# Patient Record
Sex: MALE | Race: WHITE
[De-identification: names, ages, dates, MRNs, and addresses within clinical notes are randomized per-mention and may not be internally consistent; named-entity substitution may affect disease eponyms.]

---

## 2019-07-08 ENCOUNTER — HOSPITAL ENCOUNTER (INPATIENT)
Dept: HOSPITAL 74 - YASAS | Age: 44
LOS: 5 days | Discharge: HOME | DRG: 773 | End: 2019-07-13
Attending: SURGERY | Admitting: SURGERY
Payer: COMMERCIAL

## 2019-07-08 VITALS — BODY MASS INDEX: 31.8 KG/M2

## 2019-07-08 DIAGNOSIS — F11.23: ICD-10-CM

## 2019-07-08 DIAGNOSIS — R94.5: ICD-10-CM

## 2019-07-08 DIAGNOSIS — E80.6: ICD-10-CM

## 2019-07-08 DIAGNOSIS — F10.230: Primary | ICD-10-CM

## 2019-07-08 PROCEDURE — HZ2ZZZZ DETOXIFICATION SERVICES FOR SUBSTANCE ABUSE TREATMENT: ICD-10-PCS | Performed by: SURGERY

## 2019-07-08 RX ADMIN — Medication SCH TAB: at 10:24

## 2019-07-08 RX ADMIN — Medication SCH MG: at 22:21

## 2019-07-08 RX ADMIN — METHOCARBAMOL PRN MG: 500 TABLET ORAL at 22:22

## 2019-07-08 NOTE — PN
S CIWA





- CIWA Score


Nausea/Vomitin-Mild Nausea/No Vomiting


Muscle Tremors: 3


Anxiety: 2


Agitation: 3


Paroxysmal Sweats: 1-Minimal Palms Moist


Orientation: 1-Uncertain about Date


Tacttile Disturbances: 1-Very Mild Itch/Numbness


Auditory Disturbances: 0-None


Visual Disturbances: 0-None


Headache: 0-None Present


CIWA-Ar Total Score: 12





BHS Progress Note (SOAP)


Subjective: 





tremor restlessness anxiety


tired low energy 


Objective: 





19 14:35


 Vital Signs











Temperature  96 F L  19 13:06


 


Pulse Rate  90   19 13:06


 


Respiratory Rate  20   19 13:06


 


Blood Pressure  136/81   19 13:06


 


O2 Sat by Pulse Oximetry (%)      











19 14:36


lab ordered for 19 14:36


first admission to [facility] of references for comparison


Assessment: 





19 14:44


alcohol withdrawal sx


Plan: 





continue alcohol detox

## 2019-07-08 NOTE — HP
CIWA Score


Nausea/Vomitin-Mild Nausea/No Vomiting


Muscle Tremors: 4-Moderate,w/Arms Extend


Anxiety: 3


Agitation: 3


Paroxysmal Sweats: 2


Orientation: 0-Oriented


Tacttile Disturbances: 0-None


Auditory Disturbances: 0-None


Visual Disturbances: 0-None


Headache: 2-Mild


CIWA-Ar Total Score: 15





- Admission Criteria


[facility] Guidelines: Admission for Medically Managed Detox: 


Requires at least one of the followin. CIWA greater than 12


2. Seizures within the past 24 hours


3. Delirium tremens within the past 24 hours


4. Hallucinations within the past 24 hours


5. Acute intervention needed for co  occurring medical disorder


6. Acute intervention needed for co  occurring psychiatric disorder


7. Severe withdrawal that cannot be handled at a lower level of care (continued


    vomiting, continued diarrhea, abnormal vital signs) requiring intravenous


    medication and/or fluids


8. Pregnancy








Admission ROS S





- [facility]


Chief Complaint: 





Alcohol withdrawal symptoms


Allergies/Adverse Reactions: 


 Allergies











Allergy/AdvReac Type Severity Reaction Status Date / Time


 


No Known Allergies Allergy   Verified 19 02:13











History of Present Illness: 





44 years old male with a long history of alcohol dependence is seeking 

admission to detox.  Patient reports that this is first inpatient 

rehabilitation and first admission to [facility]. He denies past medical history and 

denies suicidal ideation at this time. 


Exam Limitations: Intoxication





- Ebola screening


Have you traveled outside of the country in the last 21 days: No


Have you had contact with anyone from an Ebola affected area: No


Do you have a fever: No





- Review of Systems


Constitutional: Chills, Loss of Appetite, Malaise, Changes in sleep


EENT: reports: Sinus Pressure


Respiratory: reports: No Symptoms reported


Cardiac: reports: No Symptoms Reported


GI: reports: Poor Appetite, Poor Fluid Intake, Abdominal cramping


: reports: No Symptoms Reported


Musculoskeletal: reports: No Symptoms Reported


Integumentary: reports: Dryness, Flushing


Neuro: reports: Tremors


Endocrine: reports: No Symptoms Reported


Hematology: reports: No Symptoms Reported


Psychiatric: reports: Anxious


Other Systems: Reviewed and Negative





Patient History





- Patient Medical History


Hx Anemia: No


Hx Asthma: No


Hx Chronic Obstructive Pulmonary Disease (COPD): No


Hx Cancer: No


Hx Cardiac Disorders: No


Hx Congestive Heart Failure: No


Hx Hypertension: No


Hx Hypercholesterolemia: No


Hx Pacemaker: No


HX Cerebrovascular Accident: No


Hx Seizures: No


Hx Dementia: No


Hx Diabetes: No


Hx Gastrointestinal Disorders: No


Hx Liver Disease: No


Hx Genitourinary Disorders: No


Hx Sexually Transmitted Disorders: No


Hx Renal Disease (ESRD): No


Hx Thyroid Disease: No


Hx Human Immunodeficiency Virus (HIV): No


Hx Hepatitis C: No


Hx Depression: No


Hx Suicide Attempt: No (Denies suicigal ideation at this time)


Hx Bipolar Disorder: No


Hx Schizophrenia: No


Other Medical History: Anxiety -Not on medication





- Patient Surgical History


Past Surgical History: Yes


Hx Neurologic Surgery: No


Hx Cataract Extraction: No


Hx Cardiac Surgery: No


Hx Lung Surgery: No


Hx Abdominal Surgery: No


Hx Appendectomy: No


Hx Cholecystectomy: No


Hx Genitourinary Surgery: No


Hx Orthopedic Surgery: Yes (broken shouldr repair 2018)


Hx Hysterectomy: No


Anesthesia Reaction: No





- PPD History


Previous Implant?: Yes


Documented Results: Negative w/o proof


Implanted On Prior [facility] Admission?: No


PPD to be Administered?: Yes





- Reproductive History


Patient is a Female of Child Bearing Age (11 -55 yrs old): No (male)





- Smoking Cessation


Smoking history: Never smoked


Have you smoked in the past 12 months: No


Hx Chewing Tobacco Use: No


Initiated information on smoking cessation: No





- Substance & Tx. History


Hx Alcohol Use: Yes


Hx Substance Use: Yes


Substance Use Type: Cocaine


Hx Substance Use Treatment: No





- Substances abused


  ** Alcohol


Substance route: Oral


Frequency: Daily


Amount used: 11/2 PINT of VODKA


Age of first use: 15


Date of last use: 19





Family Disease History





- Family Disease History


Family Disease History: CA: Grandparent





Admission Physical Exam [facility]





- Vital Signs


Vital Signs: 


 Vital Signs - 24 hr











  19





  01:50


 


Temperature 97.1 F L


 


Pulse Rate 82


 


Respiratory 18





Rate 


 


Blood Pressure 141/77














- Physical


General Appearance: Yes: Moderate Distress, Tremorous, Irritable, Sweating, 

Anxious


HEENTM: Yes: Within Normal Limits, Normal Voice


Respiratory: Yes: Lungs Clear, Normal Breath Sounds, No Respiratory Distress


Neck: Yes: Supple


Breast: Yes: Within Normal Limits


Cardiology: Yes: Regular Rhythm, Regular Rate


Abdominal: Yes: Normal Bowel Sounds


Genitourinary: Yes: Within Normal Limits


Back: Yes: Normal Inspection


Musculoskeletal: Yes: full range of Motion, Gait Steady


Extremities: Yes: Tremors


Neurological: Yes: Alert, Normal Mood/Affect


Integumentary: Yes: Within Normal Limits, Warm


Lymphatic: Yes: Within Normal Limits





- Diagnostic


(1) Alcohol dependence with uncomplicated withdrawal


Current Visit: Yes   Status: Chronic   





Cleared for Admission S





- Detox or Rehab


[facility] Level of Care: Medically Managed


Detox Regimen/Protocol: Librium





Breathalyzer





- Breathalyzer


Breathalyzer: 0.079





Urine Drug Screen





- Test Device


Lot number: [id number]


Expiration date: 21





- Control


Is test valid?: Yes





- Results


Drug screen NEGATIVE: No


Urine drug screen results: SHANNAN-Cocaine, BUP-Suboxone





Inpatient Rehab Admission





- Rehab Decision to Admit


Inpatient rehab admission?: No

## 2019-07-08 NOTE — EKG
Test Reason : 

Blood Pressure : ***/*** mmHG

Vent. Rate : 086 BPM     Atrial Rate : 086 BPM

   P-R Int : 172 ms          QRS Dur : 096 ms

    QT Int : 382 ms       P-R-T Axes : 060 -06 013 degrees

   QTc Int : 457 ms

 

NORMAL SINUS RHYTHM

INFERIOR INFARCT , AGE UNDETERMINED

ABNORMAL ECG

NO PREVIOUS ECGS AVAILABLE

Confirmed by ARIANNA BALDERRAMA MD ([id number]) on 7/8/2019 11:45:11 AM

 

Referred By:  REYNOLD           Confirmed By:ARIANNA BALDERRAMA MD

## 2019-07-09 LAB
ALBUMIN SERPL-MCNC: 3.8 G/DL (ref 3.4–5)
ALP SERPL-CCNC: 220 U/L (ref 45–117)
ALT SERPL-CCNC: 103 U/L (ref 13–61)
ANION GAP SERPL CALC-SCNC: 6 MMOL/L (ref 8–16)
AST SERPL-CCNC: 156 U/L (ref 15–37)
BILIRUB SERPL-MCNC: 2.1 MG/DL (ref 0.2–1)
BUN SERPL-MCNC: 10 MG/DL (ref 7–18)
CALCIUM SERPL-MCNC: 9.6 MG/DL (ref 8.5–10.1)
CHLORIDE SERPL-SCNC: 97 MMOL/L (ref 98–107)
CO2 SERPL-SCNC: 32 MMOL/L (ref 21–32)
CREAT SERPL-MCNC: 0.7 MG/DL (ref 0.55–1.3)
DEPRECATED RDW RBC AUTO: 13.6 % (ref 11.9–15.9)
GLUCOSE SERPL-MCNC: 128 MG/DL (ref 74–106)
HCT VFR BLD CALC: 38.6 % (ref 35.4–49)
HGB BLD-MCNC: 13.1 GM/DL (ref 11.7–16.9)
MCH RBC QN AUTO: 32.1 PG (ref 25.7–33.7)
MCHC RBC AUTO-ENTMCNC: 34 G/DL (ref 32–35.9)
MCV RBC: 94.4 FL (ref 80–96)
PLATELET # BLD AUTO: 177 K/MM3 (ref 134–434)
PMV BLD: 8.6 FL (ref 7.5–11.1)
POTASSIUM SERPLBLD-SCNC: 4.2 MMOL/L (ref 3.5–5.1)
PROT SERPL-MCNC: 8.5 G/DL (ref 6.4–8.2)
RBC # BLD AUTO: 4.09 M/MM3 (ref 4–5.6)
SODIUM SERPL-SCNC: 136 MMOL/L (ref 136–145)
WBC # BLD AUTO: 8.3 K/MM3 (ref 4–10)

## 2019-07-09 RX ADMIN — Medication PRN MG: at 22:14

## 2019-07-09 RX ADMIN — Medication SCH MG: at 22:14

## 2019-07-09 RX ADMIN — Medication SCH: at 10:11

## 2019-07-09 NOTE — PN
S CIWA





- CIWA Score


Nausea/Vomitin-Mild Nausea/No Vomiting


Muscle Tremors: 3


Anxiety: 2


Agitation: 2


Paroxysmal Sweats: 1-Minimal Palms Moist


Orientation: 1-Uncertain about Date


Tacttile Disturbances: 1-Very Mild Itch/Numbness


Auditory Disturbances: 0-None


Visual Disturbances: 0-None


Headache: 0-None Present


CIWA-Ar Total Score: 11





BHS COWS





- Scale


Resting Pulse: 0= FL 80 or Below


Sweatin= Chills/Flushing


Restless Observation: 0= Sits Still


Pupil Size: 0= Normal to Room Light


Bone or Joint Aches: 1= Mild Discomfort


Runny Nose/ Eye Tearin= Nasal Congestion


GI Upset > 30mins: 2= Nausea/Diarrhea


Tremor Observation of Outstretched Hands: 2= Slight Tremor Visible


Yawning Observation: 2= >3x During Session


Anxiety or Irritability: 2=Irritable/Anxious


Goose Flesh Skin: 0=Smooth Skin


COWS Score: 11





BHS Progress Note (SOAP)


Subjective: 





patient reported that he is taking suboxone 


positive urine suboxone


begin suboxone detox regimen


patient is doing much better since suboxne detox regimen begin


emotional support for maintain sobriety


Objective: 





19 13:09


 Vital Signs











Temperature  97 F L  19 09:11


 


Pulse Rate  78   19 09:11


 


Respiratory Rate  20   19 09:11


 


Blood Pressure  132/83   19 09:11


 


O2 Sat by Pulse Oximetry (%)      








 Laboratory Last Values











WBC  8.3 K/mm3 (4.0-10.0)   19  08:30    


 


RBC  4.09 M/mm3 (4.00-5.60)   19  08:30    


 


Hgb  13.1 GM/dL (11.7-16.9)   19  08:30    


 


Hct  38.6 % (35.4-49)   19  08:30    


 


MCV  94.4 fl (80-96)   19  08:30    


 


MCH  32.1 pg (25.7-33.7)   19  08:30    


 


MCHC  34.0 g/dl (32.0-35.9)   19  08:30    


 


RDW  13.6 % (11.9-15.9)   19  08:30    


 


Plt Count  177 K/MM3 (134-434)   19  08:30    


 


MPV  8.6 fl (7.5-11.1)   19  08:30    


 


Sodium  136 mmol/L (136-145)   19  08:30    


 


Potassium  4.2 mmol/L (3.5-5.1)   19  08:30    


 


Chloride  97 mmol/L ()  L  19  08:30    


 


Carbon Dioxide  32 mmol/L (21-32)   19  08:30    


 


Anion Gap  6 MMOL/L (8-16)  L  19  08:30    


 


BUN  10.0 mg/dL (7-18)   19  08:30    


 


Creatinine  0.7 mg/dL (0.55-1.3)   19  08:30    


 


Est GFR (CKD-EPI)AfAm  133.96   19  08:30    


 


Est GFR (CKD-EPI)NonAf  115.58   19  08:30    


 


Random Glucose  128 mg/dL ()  H  19  08:30    


 


Calcium  9.6 mg/dL (8.5-10.1)   19  08:30    


 


Total Bilirubin  2.1 mg/dL (0.2-1)  H  19  08:30    


 


AST  156 U/L (15-37)  H  19  08:30    


 


ALT  103 U/L (13-61)  H  19  08:30    


 


Alkaline Phosphatase  220 U/L ()  H  19  08:30    


 


Total Protein  8.5 g/dl (6.4-8.2)  H  19  08:30    


 


Albumin  3.8 g/dl (3.4-5.0)   19  08:30    








lab noted


ast elevation 


19 13:12


recommend ativan detox regimen


Assessment: 





19 13:13


alcohol and opiate withdrawal sx


Plan: 





continue alcohol and opiate detox

## 2019-07-10 RX ADMIN — Medication SCH TAB: at 10:12

## 2019-07-10 RX ADMIN — METHOCARBAMOL PRN MG: 500 TABLET ORAL at 22:36

## 2019-07-10 RX ADMIN — Medication PRN MG: at 22:35

## 2019-07-10 RX ADMIN — Medication SCH MG: at 22:35

## 2019-07-10 NOTE — PN
S CIWA





- CIWA Score


Nausea/Vomitin-Mild Nausea/No Vomiting


Muscle Tremors: 2


Anxiety: 2


Agitation: 3


Paroxysmal Sweats: 1-Minimal Palms Moist


Orientation: 0-Oriented


Tacttile Disturbances: 1-Very Mild Itch/Numbness


Auditory Disturbances: 0-None


Visual Disturbances: 0-None


Headache: 0-None Present


CIWA-Ar Total Score: 10





BHS COWS





- Scale


Resting Pulse: 1= SD 


Sweatin= Chills/Flushing


Restless Observation: 0= Sits Still


Pupil Size: 0= Normal to Room Light


Bone or Joint Aches: 1= Mild Discomfort


Runny Nose/ Eye Tearin= Nasal Congestion


GI Upset > 30mins: 2= Nausea/Diarrhea


Tremor Observation of Outstretched Hands: 2= Slight Tremor Visible


Yawning Observation: 1= 1-2x During Session


Anxiety or Irritability: 1=Feels Anxious/Irritable


Goose Flesh Skin: 0=Smooth Skin


COWS Score: 10





BHS Progress Note (SOAP)


Subjective: 





doing well with suboxone detox for opiate misuse 


resting on bed 


encourage to discuss feelings and concerns regarding alcohol recovery 


Objective: 





07/10/19 11:54


 Vital Signs











Temperature  97.8 F   07/10/19 09:15


 


Pulse Rate  99 H  07/10/19 09:15


 


Respiratory Rate  18   07/10/19 09:15


 


Blood Pressure  121/77   07/10/19 09:15


 


O2 Sat by Pulse Oximetry (%)      








 Laboratory Last Values











WBC  8.3 K/mm3 (4.0-10.0)   19  08:30    


 


RBC  4.09 M/mm3 (4.00-5.60)   19  08:30    


 


Hgb  13.1 GM/dL (11.7-16.9)   19  08:30    


 


Hct  38.6 % (35.4-49)   19  08:30    


 


MCV  94.4 fl (80-96)   19  08:30    


 


MCH  32.1 pg (25.7-33.7)   19  08:30    


 


MCHC  34.0 g/dl (32.0-35.9)   19  08:30    


 


RDW  13.6 % (11.9-15.9)   19  08:30    


 


Plt Count  177 K/MM3 (134-434)   19  08:30    


 


MPV  8.6 fl (7.5-11.1)   19  08:30    


 


Sodium  136 mmol/L (136-145)   19  08:30    


 


Potassium  4.2 mmol/L (3.5-5.1)   19  08:30    


 


Chloride  97 mmol/L ()  L  19  08:30    


 


Carbon Dioxide  32 mmol/L (21-32)   19  08:30    


 


Anion Gap  6 MMOL/L (8-16)  L  19  08:30    


 


BUN  10.0 mg/dL (7-18)   19  08:30    


 


Creatinine  0.7 mg/dL (0.55-1.3)   19  08:30    


 


Est GFR (CKD-EPI)AfAm  133.96   19  08:30    


 


Est GFR (CKD-EPI)NonAf  115.58   19  08:30    


 


Random Glucose  128 mg/dL ()  H  19  08:30    


 


Calcium  9.6 mg/dL (8.5-10.1)   19  08:30    


 


Total Bilirubin  2.1 mg/dL (0.2-1)  H  19  08:30    


 


AST  156 U/L (15-37)  H  19  08:30    


 


ALT  103 U/L (13-61)  H  19  08:30    


 


Alkaline Phosphatase  220 U/L ()  H  19  08:30    


 


Total Protein  8.5 g/dl (6.4-8.2)  H  19  08:30    


 


Albumin  3.8 g/dl (3.4-5.0)   19  08:30    


 


RPR Titer  Nonreactive  (NONREACTIVE)   19  08:30    








lab noted


discuss alcohol related ast elevation 


Assessment: 





07/10/19 11:57


alcohol and opiate withdrawal sx


Plan: 





continue alcohol and opiate detox

## 2019-07-11 RX ADMIN — METHOCARBAMOL PRN MG: 500 TABLET ORAL at 10:13

## 2019-07-11 RX ADMIN — Medication SCH TAB: at 10:13

## 2019-07-11 RX ADMIN — HYDROXYZINE HYDROCHLORIDE PRN MG: 25 TABLET, FILM COATED ORAL at 14:51

## 2019-07-11 RX ADMIN — Medication SCH MG: at 22:15

## 2019-07-11 RX ADMIN — Medication PRN MG: at 22:16

## 2019-07-11 NOTE — PN
S CIWA





- CIWA Score


Nausea/Vomitin-Mild Nausea/No Vomiting


Muscle Tremors: 1-None Visible, but Felt


Anxiety: 2


Agitation: 2


Paroxysmal Sweats: 1-Minimal Palms Moist


Orientation: 1-Uncertain about Date


Tacttile Disturbances: 0-None


Auditory Disturbances: 0-None


Visual Disturbances: 0-None


Headache: 0-None Present


CIWA-Ar Total Score: 8





BHS COWS





- Scale


Resting Pulse: 1= ND 


Sweatin= Chills/Flushing


Restless Observation: 0= Sits Still


Pupil Size: 0= Normal to Room Light


Bone or Joint Aches: 1= Mild Discomfort


Runny Nose/ Eye Tearin= None


GI Upset > 30mins: 1= Stomach Cramp


Tremor Observation of Outstretched Hands: 1= Tremor Felt, Not Seen


Yawning Observation: 0= None


Anxiety or Irritability: 1=Feels Anxious/Irritable


Goose Flesh Skin: 0=Smooth Skin


COWS Score: 6





BHS Progress Note (SOAP)


Subjective: 





44 years male admitted on 19 for alcohol and opiate withdrawal sx


doing well with suboxone detox regimen mild nausea and body aches


discuss medication assisted treatment program


Objective: 





19 14:57


 Vital Signs











Temperature  99.1 F   19 14:17


 


Pulse Rate  86   19 14:17


 


Respiratory Rate  18   19 14:17


 


Blood Pressure  112/64   19 14:17


 


O2 Sat by Pulse Oximetry (%)      








 Laboratory Last Values











WBC  8.3 K/mm3 (4.0-10.0)   19  08:30    


 


RBC  4.09 M/mm3 (4.00-5.60)   19  08:30    


 


Hgb  13.1 GM/dL (11.7-16.9)   19  08:30    


 


Hct  38.6 % (35.4-49)   19  08:30    


 


MCV  94.4 fl (80-96)   19  08:30    


 


MCH  32.1 pg (25.7-33.7)   19  08:30    


 


MCHC  34.0 g/dl (32.0-35.9)   19  08:30    


 


RDW  13.6 % (11.9-15.9)   19  08:30    


 


Plt Count  177 K/MM3 (134-434)   19  08:30    


 


MPV  8.6 fl (7.5-11.1)   19  08:30    


 


Sodium  136 mmol/L (136-145)   19  08:30    


 


Potassium  4.2 mmol/L (3.5-5.1)   19  08:30    


 


Chloride  97 mmol/L ()  L  19  08:30    


 


Carbon Dioxide  32 mmol/L (21-32)   19  08:30    


 


Anion Gap  6 MMOL/L (8-16)  L  19  08:30    


 


BUN  10.0 mg/dL (7-18)   19  08:30    


 


Creatinine  0.7 mg/dL (0.55-1.3)   19  08:30    


 


Est GFR (CKD-EPI)AfAm  133.96   19  08:30    


 


Est GFR (CKD-EPI)NonAf  115.58   19  08:30    


 


Random Glucose  128 mg/dL ()  H  19  08:30    


 


Calcium  9.6 mg/dL (8.5-10.1)   19  08:30    


 


Total Bilirubin  2.1 mg/dL (0.2-1)  H  19  08:30    


 


AST  156 U/L (15-37)  H  19  08:30    


 


ALT  103 U/L (13-61)  H  19  08:30    


 


Alkaline Phosphatase  220 U/L ()  H  19  08:30    


 


Total Protein  8.5 g/dl (6.4-8.2)  H  19  08:30    


 


Albumin  3.8 g/dl (3.4-5.0)   19  08:30    


 


RPR Titer  Nonreactive  (NONREACTIVE)   19  08:30    








lab noted


ast elevation


Assessment: 





19 14:58


alcohol and opiate withdrawal sx


Plan: 





continue alcohol and opiate detox

## 2019-07-12 LAB
ALP SERPL-CCNC: 196 U/L (ref 45–117)
APPEARANCE UR: CLEAR
AST SERPL-CCNC: 138 U/L (ref 15–37)
BACTERIA # UR AUTO: 0.7 /HPF
BILIRUB UR STRIP.AUTO-MCNC: NEGATIVE MG/DL
CASTS URNS QL MICRO: 0 /LPF (ref 0–8)
COLOR UR: YELLOW
EPITH CASTS URNS QL MICRO: 0.2 /HPF
KETONES UR QL STRIP: NEGATIVE
LEUKOCYTE ESTERASE UR QL STRIP.AUTO: NEGATIVE
NITRITE UR QL STRIP: NEGATIVE
PH UR: 5 [PH] (ref 5–8)
PROT UR QL STRIP: (no result)
PROT UR QL STRIP: NEGATIVE
RBC # BLD AUTO: 1 /HPF (ref 0–4)
SP GR UR: 1.01 (ref 1.01–1.03)
UROBILINOGEN UR STRIP-MCNC: 0.2 MG/DL (ref 0.2–1)
WBC # UR AUTO: 0 /HPF (ref 0–5)

## 2019-07-12 RX ADMIN — HYDROXYZINE HYDROCHLORIDE PRN MG: 25 TABLET, FILM COATED ORAL at 10:19

## 2019-07-12 RX ADMIN — Medication SCH MG: at 22:06

## 2019-07-12 RX ADMIN — HYDROXYZINE HYDROCHLORIDE PRN MG: 25 TABLET, FILM COATED ORAL at 22:05

## 2019-07-12 RX ADMIN — Medication SCH TAB: at 10:18

## 2019-07-12 RX ADMIN — Medication PRN MG: at 22:06

## 2019-07-12 NOTE — PN
S CIWA





- CIWA Score


Nausea/Vomitin-No Nausea/No Vomiting


Muscle Tremors: None


Anxiety: 3


Agitation: 1-Slight > Activity


Paroxysmal Sweats: No Perspiration


Orientation: 0-Oriented


Tacttile Disturbances: 1-Very Mild Itch/Numbness


Auditory Disturbances: 1-Very Mild


Visual Disturbances: 0-None


Headache: 0-None Present


CIWA-Ar Total Score: 6





BHS Progress Note (SOAP)


Subjective: 





Fatigue, anxious.


Objective: 


PATIENT A & O X 3, OBSERVED AMBULATING ON UNIT UNASSISTED. IN NO ACUTE DISTRESS.





19 15:17


 Vital Signs











Temperature  98.8 F   19 13:07


 


Pulse Rate  86   19 13:07


 


Respiratory Rate  18   19 13:07


 


Blood Pressure  124/71   19 13:07


 


O2 Sat by Pulse Oximetry (%)      








 Laboratory Tests











  19





  08:30 08:30 08:30


 


WBC  8.3  


 


RBC  4.09  


 


Hgb  13.1  


 


Hct  38.6  


 


MCV  94.4  


 


MCH  32.1  


 


MCHC  34.0  


 


RDW  13.6  


 


Plt Count  177  


 


MPV  8.6  


 


Sodium   136 


 


Potassium   4.2 


 


Chloride   97 L 


 


Carbon Dioxide   32 


 


Anion Gap   6 L 


 


BUN   10.0 


 


Creatinine   0.7 


 


Est GFR (CKD-EPI)AfAm   133.96 


 


Est GFR (CKD-EPI)NonAf   115.58 


 


Random Glucose   128 H 


 


Calcium   9.6 


 


Total Bilirubin   2.1 H 


 


AST   156 H 


 


ALT   103 H 


 


Alkaline Phosphatase   220 H 


 


Total Protein   8.5 H 


 


Albumin   3.8 


 


Urine Color   


 


Urine Appearance   


 


Urine pH   


 


Ur Specific Gravity   


 


Urine Protein   


 


Urine Glucose (UA)   


 


Urine Ketones   


 


Urine Blood   


 


Urine Nitrite   


 


Urine Bilirubin   


 


Urine Urobilinogen   


 


Ur Leukocyte Esterase   


 


Urine WBC (Auto)   


 


Urine RBC (Auto)   


 


Urine Casts (Auto)   


 


U Epithel Cells (Auto)   


 


Urine Bacteria (Auto)   


 


RPR Titer    Nonreactive














  19





  07:00 09:30


 


WBC  


 


RBC  


 


Hgb  


 


Hct  


 


MCV  


 


MCH  


 


MCHC  


 


RDW  


 


Plt Count  


 


MPV  


 


Sodium  


 


Potassium  


 


Chloride  


 


Carbon Dioxide  


 


Anion Gap  


 


BUN  


 


Creatinine  


 


Est GFR (CKD-EPI)AfAm  


 


Est GFR (CKD-EPI)NonAf  


 


Random Glucose  


 


Calcium  


 


Total Bilirubin  


 


AST  138 H 


 


ALT  


 


Alkaline Phosphatase  196 H 


 


Total Protein  


 


Albumin  


 


Urine Color   Yellow


 


Urine Appearance   Clear


 


Urine pH   5.0


 


Ur Specific Gravity   1.010


 


Urine Protein   2+ H


 


Urine Glucose (UA)   Negative


 


Urine Ketones   Negative


 


Urine Blood   Negative


 


Urine Nitrite   Negative


 


Urine Bilirubin   Negative


 


Urine Urobilinogen   0.2


 


Ur Leukocyte Esterase   Negative


 


Urine WBC (Auto)   0


 


Urine RBC (Auto)   1


 


Urine Casts (Auto)   0


 


U Epithel Cells (Auto)   0.2


 


Urine Bacteria (Auto)   0.7


 


RPR Titer  








LABS NOTED.


RESULTS OF REPEAT AST AND ALKALINE PHOSPHATASE LEVELS NOTED. MILD REDUCTIONS 

NOTED IN BOTH VALUES IN COMPARISON TO ADMISSION LAB VALUES.





19 15:18


Assessment: 





19 15:19


WITHDRAWAL SYMPTOMS.


ELEVATED LIVER ENZYMES.


HYPERBILIRUBINEMIA.


Plan: 





CONTINUE DETOX.


INCREASE DAILY PO WATER INTAKE.





PATIENT SCHEDULED FOR D/C FROM DETOX UNIT TOMORROW.

## 2019-07-13 VITALS — HEART RATE: 92 BPM | TEMPERATURE: 98.6 F | DIASTOLIC BLOOD PRESSURE: 79 MMHG | SYSTOLIC BLOOD PRESSURE: 116 MMHG

## 2019-07-13 NOTE — DS
[facility] Detox Discharge Summary


Admission Date: 


07/08/19





Discharge Date: 07/13/19





- History


Present History: Alcohol Dependence


Additional Comments: 





PATIENT GOING HOME AND RETURNING TO WORK AT THIS TIME. PATIENT ADVISED TO 

CONSIDER LOCAL 12-STEP / AA OUTPATIENT SUPPORT GROUP PROGRAMS FOR AFTERCARE. 

PATIENT ADVISED TO FOLLOW-UP WITH  AFTER DISCHARGE FROM DETOX FOR GENERAL 

MEDICAL ASSESSMENT AND FOR ELEVATED LIVER ENZYMES AND FOR ELEVATED BILIRUBIN 

LEVEL NOTED ON DETOX ADMISSION AND REPEAT LABORATORY ASSESSMENTS. PATIENT 

VERBALIZED UNDERSTANDING OF ALL RECOMMENDATIONS PRESENTED TO HIM AT TIME OF 

DISCHARGE FROM DETOX UNIT. COPIES OF RESULTS OF ALL LABS DRAWN WHILE ADMITTED 

FOR DETOX GIVEN TO PATIENT AT TIME OF DISCHARGE FROM DETOX UNIT. PATIENT WAS 

DISCHARGED FROM DETOX UNIT IN STABLE MEDICAL CONDITION.


Pertinent Past History: 





Anxiety, Hyperbilirubinemia, Elevated Liver Enzymes.





- Physical Exam Results


Vital Signs: 


 Vital Signs











Temperature  98.6 F   07/13/19 09:27


 


Pulse Rate  92 H  07/13/19 09:27


 


Respiratory Rate  18   07/13/19 09:27


 


Blood Pressure  116/79   07/13/19 09:27


 


O2 Sat by Pulse Oximetry (%)      











Pertinent Admission Physical Exam Findings: 





WITHDRAWAL SYMPTOMS.





 Laboratory Tests











  07/09/19 07/09/19 07/09/19





  08:30 08:30 08:30


 


WBC  8.3  


 


RBC  4.09  


 


Hgb  13.1  


 


Hct  38.6  


 


MCV  94.4  


 


MCH  32.1  


 


MCHC  34.0  


 


RDW  13.6  


 


Plt Count  177  


 


MPV  8.6  


 


Sodium   136 


 


Potassium   4.2 


 


Chloride   97 L 


 


Carbon Dioxide   32 


 


Anion Gap   6 L 


 


BUN   10.0 


 


Creatinine   0.7 


 


Est GFR (CKD-EPI)AfAm   133.96 


 


Est GFR (CKD-EPI)NonAf   115.58 


 


Random Glucose   128 H 


 


Calcium   9.6 


 


Total Bilirubin   2.1 H 


 


AST   156 H 


 


ALT   103 H 


 


Alkaline Phosphatase   220 H 


 


Total Protein   8.5 H 


 


Albumin   3.8 


 


Urine Color   


 


Urine Appearance   


 


Urine pH   


 


Ur Specific Gravity   


 


Urine Protein   


 


Urine Glucose (UA)   


 


Urine Ketones   


 


Urine Blood   


 


Urine Nitrite   


 


Urine Bilirubin   


 


Urine Urobilinogen   


 


Ur Leukocyte Esterase   


 


Urine WBC (Auto)   


 


Urine RBC (Auto)   


 


Urine Casts (Auto)   


 


U Epithel Cells (Auto)   


 


Urine Bacteria (Auto)   


 


RPR Titer    Nonreactive














  07/12/19 07/12/19





  07:00 09:30


 


WBC  


 


RBC  


 


Hgb  


 


Hct  


 


MCV  


 


MCH  


 


MCHC  


 


RDW  


 


Plt Count  


 


MPV  


 


Sodium  


 


Potassium  


 


Chloride  


 


Carbon Dioxide  


 


Anion Gap  


 


BUN  


 


Creatinine  


 


Est GFR (CKD-EPI)AfAm  


 


Est GFR (CKD-EPI)NonAf  


 


Random Glucose  


 


Calcium  


 


Total Bilirubin  


 


AST  138 H 


 


ALT  


 


Alkaline Phosphatase  196 H 


 


Total Protein  


 


Albumin  


 


Urine Color   Yellow


 


Urine Appearance   Clear


 


Urine pH   5.0


 


Ur Specific Gravity   1.010


 


Urine Protein   2+ H


 


Urine Glucose (UA)   Negative


 


Urine Ketones   Negative


 


Urine Blood   Negative


 


Urine Nitrite   Negative


 


Urine Bilirubin   Negative


 


Urine Urobilinogen   0.2


 


Ur Leukocyte Esterase   Negative


 


Urine WBC (Auto)   0


 


Urine RBC (Auto)   1


 


Urine Casts (Auto)   0


 


U Epithel Cells (Auto)   0.2


 


Urine Bacteria (Auto)   0.7


 


RPR Titer  








LABS NOTED.





- Treatment


Hospital Course: Detox Protocol Followed, Detoxed Safely, Responded well, 

Discharged Condition Good


Patient has Accepted a Rehab Referral to: PT. ADVISED TO CONSIDER LOCAL 12-STEP 

/ [facility] OUTPATIENT SUPPORT GROUPS.





- Medication


Discharge Medications: 


Ambulatory Orders





NK [No Known Home Medication]  07/08/19 











- Diagnosis


(1) Elevated liver enzymes


Status: Acute   





(2) Hyperbilirubinemia


Status: Acute   





(3) Alcohol dependence with uncomplicated withdrawal


Status: Acute   





- AMA


Did Patient Leave Against Medical Advice: No